# Patient Record
Sex: FEMALE | Race: BLACK OR AFRICAN AMERICAN | NOT HISPANIC OR LATINO | Employment: FULL TIME | URBAN - METROPOLITAN AREA
[De-identification: names, ages, dates, MRNs, and addresses within clinical notes are randomized per-mention and may not be internally consistent; named-entity substitution may affect disease eponyms.]

---

## 2024-02-29 ENCOUNTER — HOSPITAL ENCOUNTER (OUTPATIENT)
Dept: RADIOLOGY | Facility: HOSPITAL | Age: 49
Discharge: HOME | End: 2024-02-29
Payer: COMMERCIAL

## 2024-02-29 ENCOUNTER — OFFICE VISIT (OUTPATIENT)
Dept: ORTHOPEDIC SURGERY | Facility: HOSPITAL | Age: 49
End: 2024-02-29
Payer: COMMERCIAL

## 2024-02-29 VITALS — WEIGHT: 185 LBS | HEIGHT: 66 IN | BODY MASS INDEX: 29.73 KG/M2

## 2024-02-29 DIAGNOSIS — M25.561 RIGHT KNEE PAIN, UNSPECIFIED CHRONICITY: ICD-10-CM

## 2024-02-29 DIAGNOSIS — M11.261 PSEUDOGOUT OF KNEE, RIGHT: ICD-10-CM

## 2024-02-29 PROCEDURE — 73564 X-RAY EXAM KNEE 4 OR MORE: CPT | Mod: RT

## 2024-02-29 PROCEDURE — 99203 OFFICE O/P NEW LOW 30 MIN: CPT | Performed by: EMERGENCY MEDICINE

## 2024-02-29 PROCEDURE — 99213 OFFICE O/P EST LOW 20 MIN: CPT | Performed by: EMERGENCY MEDICINE

## 2024-02-29 PROCEDURE — 73564 X-RAY EXAM KNEE 4 OR MORE: CPT | Mod: RIGHT SIDE | Performed by: RADIOLOGY

## 2024-02-29 PROCEDURE — 1036F TOBACCO NON-USER: CPT | Performed by: EMERGENCY MEDICINE

## 2024-02-29 RX ORDER — ESCITALOPRAM OXALATE 10 MG/1
1 TABLET ORAL DAILY
COMMUNITY
Start: 2022-05-26

## 2024-02-29 RX ORDER — MELOXICAM 15 MG/1
15 TABLET ORAL DAILY
Qty: 30 TABLET | Refills: 1 | Status: SHIPPED | OUTPATIENT
Start: 2024-02-29 | End: 2024-04-29

## 2024-02-29 NOTE — PROGRESS NOTES
Chief Complaint: Pain of the Right Knee  History of Present Illness:  Encounter date: 02/29/2024  Shania Hernandez is a 49 y.o. female who presents today for evaluation of right knee pain.      Pain was insidious in onset and she does not recall any injury or trauma prior to the onset of pain.  She was seen in the emergency room at [facility] on 2/26/2024 for worsening right knee pain.  She was provided a Toradol shot and lidocaine patches with some improvement in her pain.  She has tried ice and heat at home with some improvement.  Her pain is worse with ambulating and she feels that her leg is stiff.  She has had this multiple times but did not seek medical treatment before in the past.  She has had no changes to her diet or increased alcohol intake.  Rates her pain as a 1/10 today but it was a 10/10 in the ER.  Denies numbness, tingling, weakness      Problem List  There are no problems to display for this patient.      Past Medical History  No past medical history on file.    Past Surgical History  No past surgical history on file.    Social History  Social History     Socioeconomic History    Marital status: Single     Spouse name: Not on file    Number of children: Not on file    Years of education: Not on file    Highest education level: Not on file   Occupational History    Not on file   Tobacco Use    Smoking status: Never    Smokeless tobacco: Never   Substance and Sexual Activity    Alcohol use: Not on file    Drug use: Not on file    Sexual activity: Not on file   Other Topics Concern    Not on file   Social History Narrative    Not on file     Social Determinants of Health     Financial Resource Strain: Not on file   Food Insecurity: Not on file   Transportation Needs: Not on file   Physical Activity: Not on file   Stress: Not on file   Social Connections: Not on file   Intimate Partner Violence: Not on file   Housing Stability: Not on file       Allergies  No Known Allergies    Medications  Current Outpatient  Medications   Medication Sig Dispense Refill    escitalopram (Lexapro) 10 mg tablet Take 1 tablet (10 mg) by mouth once daily.      meloxicam (Mobic) 15 mg tablet Take 1 tablet (15 mg) by mouth once daily. 30 tablet 1     No current facility-administered medications for this visit.       Physical Exam:  Examination of the right knee: Range of motion is limited in extension and in flexion and painful.  1+ effusion present. No patellar apprehension.  There is tenderness to palpation over the medial and lateral joint lines.  No tenderness to palpation over extensor mechanism or patellar facets. Varus and valgus stress test are negative. Lachman's negative. Posterior drawer is negative. Dorothy's and meniscal grind tests are negative.    Imaging:  Radiographs/images of the right knee obtained today in the office were reviewed and revealed X-ray of the right knee shows mild osteoarthritic changes in the medial and lateral compartment with some mild spurring over the patellofemoral joint, chondrocalcinosis present.    The studies were reviewed with Dr. Norris personally in the office today.    Problem List Items Addressed This Visit    None  Visit Diagnoses         Codes    Pseudogout of knee, right     M11.261    Relevant Medications    meloxicam (Mobic) 15 mg tablet    Other Relevant Orders    XR knee right 4+ views          Patient Discussion/Summary  Pseudogout-patient presents with recurrent episodes of right knee pain most recent occurring 5 days ago.  On physical exam she has right knee effusion and warmth over the joint, tenderness palpation of the medial lateral joint lines, limited flexion extension secondary to pain.  X-rays show mild osteoarthritic changes in the medial lateral compartments with some mild spurring over the patellofemoral joint and chondrocalcinosis present in bilateral compartments.  Is most consistent with pseudogout.  Recommended oral NSAIDs with the direction to take as needed for pain  and discontinue 24 hours after pain subsides.  Plan for follow-up as needed at this time.  All questions answered she was agreeable with plan.    Homer Griffith, DO  Primary Care Sports Medicine Fellow      ** Please excuse any errors in grammar or translation related to this dictation. Voice recognition software was utilized to prepare this document. **

## 2024-04-22 ENCOUNTER — APPOINTMENT (OUTPATIENT)
Dept: ORTHOPEDIC SURGERY | Facility: HOSPITAL | Age: 49
End: 2024-04-22
Payer: COMMERCIAL